# Patient Record
Sex: MALE | Race: WHITE
[De-identification: names, ages, dates, MRNs, and addresses within clinical notes are randomized per-mention and may not be internally consistent; named-entity substitution may affect disease eponyms.]

---

## 2019-04-05 ENCOUNTER — HOSPITAL ENCOUNTER (OUTPATIENT)
Dept: HOSPITAL 92 - BICCT | Age: 19
Discharge: HOME | End: 2019-04-05
Attending: ORTHOPAEDIC SURGERY
Payer: COMMERCIAL

## 2019-04-05 DIAGNOSIS — M43.06: Primary | ICD-10-CM

## 2019-04-05 PROCEDURE — 72131 CT LUMBAR SPINE W/O DYE: CPT

## 2019-04-05 NOTE — CT
FExam: Lumbar spine CT without contrast



HISTORY: Back injury at age 10. Low back pain, x1 month. Evaluate for pars defects.



COMPARISON: None



FINDINGS: No retroperitoneal/paraspinal mass, lymphadenopathy or hematoma. Symmetric attenuation of t
he psoas muscles

Visualized alimentary canal and solid organs are grossly unremarkable

5 lumbar type vertebral bodies. Lumbar spine vertebral body height is maintained. No fracture. No spo
ndylolisthesis. No spondylolysis.

Limited evaluation of the contents of the central spinal canal and neural foramina due to technique

T12-L1: No significant central canal stenosis or foraminal narrowing

L1-L2: No significant canal stenosis or foraminal narrowing

L2-L3: No significant central canal stenosis or foraminal narrowing

L3-L4: Generalized disc bulge with mild central canal stenosis. Neural foramina are patent

L4-L5: Generalized disc bulge with minimal central canal stenosis. Neural foramina are patent.

L5-S1: Generalized disc bulge without significant central canal stenosis or neural foraminal narrowin
g



IMPRESSION:

1. No evidence of pars defects. No spondylolisthesis or spondylolysis.

2. No significant central canal stenosis or neural foraminal narrowing.



Reported By: Loida Castaneda 

Electronically Signed:  4/5/2019 4:01 PM